# Patient Record
Sex: MALE | Race: WHITE | URBAN - METROPOLITAN AREA
[De-identification: names, ages, dates, MRNs, and addresses within clinical notes are randomized per-mention and may not be internally consistent; named-entity substitution may affect disease eponyms.]

---

## 2020-11-10 ENCOUNTER — OFFICE VISIT (OUTPATIENT)
Dept: ALLERGY | Facility: CLINIC | Age: 35
End: 2020-11-10
Payer: COMMERCIAL

## 2020-11-10 ENCOUNTER — NURSE ONLY (OUTPATIENT)
Dept: ALLERGY | Facility: CLINIC | Age: 35
End: 2020-11-10
Payer: COMMERCIAL

## 2020-11-10 VITALS
BODY MASS INDEX: 28.88 KG/M2 | SYSTOLIC BLOOD PRESSURE: 127 MMHG | HEIGHT: 74 IN | DIASTOLIC BLOOD PRESSURE: 90 MMHG | OXYGEN SATURATION: 97 % | HEART RATE: 88 BPM | WEIGHT: 225 LBS

## 2020-11-10 DIAGNOSIS — J30.89 PERENNIAL ALLERGIC RHINITIS: ICD-10-CM

## 2020-11-10 DIAGNOSIS — J32.9 CHRONIC SINUSITIS, UNSPECIFIED LOCATION: Primary | ICD-10-CM

## 2020-11-10 DIAGNOSIS — J32.9 CHRONIC SINUSITIS, UNSPECIFIED LOCATION: ICD-10-CM

## 2020-11-10 PROCEDURE — 3008F BODY MASS INDEX DOCD: CPT | Performed by: ALLERGY & IMMUNOLOGY

## 2020-11-10 PROCEDURE — 3074F SYST BP LT 130 MM HG: CPT | Performed by: ALLERGY & IMMUNOLOGY

## 2020-11-10 PROCEDURE — 3080F DIAST BP >= 90 MM HG: CPT | Performed by: ALLERGY & IMMUNOLOGY

## 2020-11-10 PROCEDURE — 95004 PERQ TESTS W/ALRGNC XTRCS: CPT | Performed by: ALLERGY & IMMUNOLOGY

## 2020-11-10 PROCEDURE — 95024 IQ TESTS W/ALLERGENIC XTRCS: CPT | Performed by: ALLERGY & IMMUNOLOGY

## 2020-11-10 PROCEDURE — 99204 OFFICE O/P NEW MOD 45 MIN: CPT | Performed by: ALLERGY & IMMUNOLOGY

## 2020-11-10 RX ORDER — CEFPODOXIME PROXETIL 200 MG/1
200 TABLET, FILM COATED ORAL
COMMUNITY
Start: 2020-10-30

## 2020-11-10 RX ORDER — BETAMETHASONE DIPROPIONATE 0.5 MG/G
LOTION TOPICAL
COMMUNITY

## 2020-11-10 RX ORDER — AZITHROMYCIN 250 MG/1
TABLET, FILM COATED ORAL
COMMUNITY
Start: 2019-10-28

## 2020-11-10 RX ORDER — CETIRIZINE HYDROCHLORIDE 10 MG/1
10 TABLET ORAL
COMMUNITY

## 2020-11-10 RX ORDER — OMEPRAZOLE 20 MG/1
1 TABLET, DELAYED RELEASE ORAL DAILY
COMMUNITY

## 2020-11-10 RX ORDER — AZELASTINE 1 MG/ML
2 SPRAY, METERED NASAL
COMMUNITY
Start: 2020-10-30

## 2020-11-10 RX ORDER — AMOXICILLIN 500 MG/1
CAPSULE ORAL
COMMUNITY
Start: 2020-08-26

## 2020-11-10 NOTE — PATIENT INSTRUCTIONS
Recs:  Handouts on allergic rhinitis versus nonallergic rhinitis provided and reviewed  Handouts on allergies and avoidance measures provided and reviewed including the potential treatment option of immunotherapy if individual allergens are detected  Robyn Heller

## 2020-11-10 NOTE — PROGRESS NOTES
Amrik Island is a 37year old male.     HPI:   Patient presents with:  Sinus Problem: has had allergies for the past 10 years with sinus issues,at times has sinus pressure and congestion, most of the time congestion is clear, no fevers, at times has it 0.05 % External Lotion betamethasone dipropionate 0.05 % lotion     • Cefpodoxime Proxetil 200 MG Oral Tab Take 200 mg by mouth.          Allergies:    Amoxicillin-Pot Cla*    DIARRHEA, NAUSEA ONLY  Dust Mite Extract       Runny nose      ROS:     Allergic/ sinusitis, unspecified location  (primary encounter diagnosis)  Perennial allergic rhinitis    Patient is a 61-year-old male with a 10+ year history of reported environmental allergies and sinus symptoms. Symptoms have worsened over the past year.   Yoly myself. Teaching included  a review of potential adverse side effects as well as potential efficacy. Patient's questions were answered in regards to medication administration and dosing and potential side effects.  Teaching was provided via the teach back

## 2021-11-08 ENCOUNTER — APPOINTMENT (OUTPATIENT)
Dept: URBAN - METROPOLITAN AREA CLINIC 321 | Age: 36
Setting detail: DERMATOLOGY
End: 2021-11-08

## 2021-11-08 DIAGNOSIS — Q828 OTHER SPECIFIED ANOMALIES OF SKIN: ICD-10-CM

## 2021-11-08 DIAGNOSIS — Q826 OTHER SPECIFIED ANOMALIES OF SKIN: ICD-10-CM

## 2021-11-08 DIAGNOSIS — B36.0 PITYRIASIS VERSICOLOR: ICD-10-CM

## 2021-11-08 DIAGNOSIS — Q819 OTHER SPECIFIED ANOMALIES OF SKIN: ICD-10-CM

## 2021-11-08 DIAGNOSIS — L738 OTHER SPECIFIED DISEASES OF HAIR AND HAIR FOLLICLES: ICD-10-CM

## 2021-11-08 DIAGNOSIS — Z87.2 PERSONAL HISTORY OF DISEASES OF THE SKIN AND SUBCUTANEOUS TISSUE: ICD-10-CM

## 2021-11-08 DIAGNOSIS — L663 OTHER SPECIFIED DISEASES OF HAIR AND HAIR FOLLICLES: ICD-10-CM

## 2021-11-08 PROBLEM — L02.425 FURUNCLE OF RIGHT LOWER LIMB: Status: ACTIVE | Noted: 2021-11-08

## 2021-11-08 PROBLEM — L85.8 OTHER SPECIFIED EPIDERMAL THICKENING: Status: ACTIVE | Noted: 2021-11-08

## 2021-11-08 PROBLEM — L02.426 FURUNCLE OF LEFT LOWER LIMB: Status: ACTIVE | Noted: 2021-11-08

## 2021-11-08 PROBLEM — L02.12 FURUNCLE OF NECK: Status: ACTIVE | Noted: 2021-11-08

## 2021-11-08 PROCEDURE — OTHER PRESCRIPTION: OTHER

## 2021-11-08 PROCEDURE — OTHER COUNSELING: OTHER

## 2021-11-08 PROCEDURE — 99214 OFFICE O/P EST MOD 30 MIN: CPT

## 2021-11-08 RX ORDER — CLINDAMYCIN PHOSPHATE 10 MG/G
GEL TOPICAL
Qty: 30 | Refills: 5 | Status: ERX | COMMUNITY
Start: 2021-11-08

## 2021-11-08 RX ORDER — KETOCONAZOLE 20 MG/ML
SHAMPOO, SUSPENSION TOPICAL BID
Qty: 120 | Refills: 10 | Status: ERX | COMMUNITY
Start: 2021-11-08

## 2021-11-08 RX ORDER — FLUCONAZOLE 150 MG/1
TABLET ORAL
Qty: 4 | Refills: 0 | Status: ERX | COMMUNITY
Start: 2021-11-08

## 2021-11-08 ASSESSMENT — LOCATION DETAILED DESCRIPTION DERM
LOCATION DETAILED: RIGHT ANTERIOR PROXIMAL THIGH
LOCATION DETAILED: RIGHT PROXIMAL POSTERIOR UPPER ARM
LOCATION DETAILED: RIGHT INFERIOR POSTERIOR NECK
LOCATION DETAILED: SUPERIOR LUMBAR SPINE
LOCATION DETAILED: LEFT CENTRAL MALAR CHEEK
LOCATION DETAILED: RIGHT INFERIOR MEDIAL MIDBACK
LOCATION DETAILED: RIGHT LATERAL MALAR CHEEK
LOCATION DETAILED: LEFT PROXIMAL POSTERIOR UPPER ARM
LOCATION DETAILED: LEFT ANTERIOR PROXIMAL THIGH

## 2021-11-08 ASSESSMENT — LOCATION ZONE DERM
LOCATION ZONE: ARM
LOCATION ZONE: LEG
LOCATION ZONE: NECK
LOCATION ZONE: TRUNK
LOCATION ZONE: FACE

## 2021-11-08 ASSESSMENT — LOCATION SIMPLE DESCRIPTION DERM
LOCATION SIMPLE: LEFT CHEEK
LOCATION SIMPLE: RIGHT CHEEK
LOCATION SIMPLE: RIGHT UPPER ARM
LOCATION SIMPLE: LOWER BACK
LOCATION SIMPLE: LEFT THIGH
LOCATION SIMPLE: POSTERIOR NECK
LOCATION SIMPLE: RIGHT LOWER BACK
LOCATION SIMPLE: LEFT UPPER ARM
LOCATION SIMPLE: RIGHT THIGH

## 2022-11-09 ENCOUNTER — APPOINTMENT (OUTPATIENT)
Dept: URBAN - METROPOLITAN AREA CLINIC 244 | Age: 37
Setting detail: DERMATOLOGY
End: 2022-11-13

## 2022-11-09 DIAGNOSIS — B36.0 PITYRIASIS VERSICOLOR: ICD-10-CM

## 2022-11-09 DIAGNOSIS — L82.1 OTHER SEBORRHEIC KERATOSIS: ICD-10-CM

## 2022-11-09 DIAGNOSIS — Z87.2 PERSONAL HISTORY OF DISEASES OF THE SKIN AND SUBCUTANEOUS TISSUE: ICD-10-CM

## 2022-11-09 DIAGNOSIS — L81.4 OTHER MELANIN HYPERPIGMENTATION: ICD-10-CM

## 2022-11-09 DIAGNOSIS — D22 MELANOCYTIC NEVI: ICD-10-CM

## 2022-11-09 PROBLEM — D22.5 MELANOCYTIC NEVI OF TRUNK: Status: ACTIVE | Noted: 2022-11-09

## 2022-11-09 PROCEDURE — OTHER COUNSELING: OTHER

## 2022-11-09 PROCEDURE — OTHER PRESCRIPTION: OTHER

## 2022-11-09 PROCEDURE — 99214 OFFICE O/P EST MOD 30 MIN: CPT

## 2022-11-09 RX ORDER — KETOCONAZOLE 20 MG/ML
SHAMPOO, SUSPENSION TOPICAL BID
Qty: 120 | Refills: 10 | Status: ERX

## 2022-11-09 RX ORDER — KETOCONAZOLE 20 MG/G
CREAM TOPICAL BID
Qty: 60 | Refills: 10 | Status: ERX | COMMUNITY
Start: 2022-11-09

## 2022-11-09 ASSESSMENT — LOCATION DETAILED DESCRIPTION DERM
LOCATION DETAILED: RIGHT SUPERIOR MEDIAL UPPER BACK
LOCATION DETAILED: LEFT MEDIAL UPPER BACK
LOCATION DETAILED: RIGHT INFERIOR MEDIAL MIDBACK
LOCATION DETAILED: SUPERIOR LUMBAR SPINE
LOCATION DETAILED: UPPER STERNUM

## 2022-11-09 ASSESSMENT — LOCATION SIMPLE DESCRIPTION DERM
LOCATION SIMPLE: CHEST
LOCATION SIMPLE: LEFT UPPER BACK
LOCATION SIMPLE: RIGHT LOWER BACK
LOCATION SIMPLE: RIGHT UPPER BACK
LOCATION SIMPLE: LOWER BACK

## 2022-11-09 ASSESSMENT — LOCATION ZONE DERM: LOCATION ZONE: TRUNK

## 2023-12-11 NOTE — H&P
[facility] - Gastroenterology                                                                                                               Reason for consult: eval    Requesting physician or provider: Jazmin Cloud MD    Chief Complaint   Patient presents with    Gastro-esophageal Reflux       HPI:   Mello Dahl is a 41year old year-old male without significant Pmhx:     he is here today for evaluation gerd  Onset of sx 2015   Was having dental issues and was started on ibuprofen. 1 mos after use had an alcholic drink and that night and woke with heartburn. Since the has had gerd, heartburn. He has been on omeprazole 20 mg/daily with improvement in complaints. Sx return when he stops therapy. Has had dysphagia 1-2x with solids since onset of symptoms at sternal notch. No odynophagia and/or globus. he says had abdominal pain last summer an attributed to constipation. he denies nausea and/or vomiting. he denies recent change in appetite and/or unintentional weight loss. he moves his bowels daily with use of probiotic. he denies straining and/or incomplete evacuation. H/o brbpr with hemorrhoids. No melena. Not anemic on cbc 2/2023    Saw ent at [facility] and had laryngoscope in aug  He does not recall results    NSAIDS: prn  Tobacco: no  Alcohol: occasional  Marijuana: no  Illicit drugs: no    No FDR w/ GI malignancy  Paternal gf had colon ca around age 59    No history of adverse reaction to sedation  No FIDEL  No anticoagulants  No pacemaker/defibrillator  Nortriptyline for migraines      Last colonoscopy: no  Last EGD: no    Wt Readings from Last 6 Encounters:   12/13/23 251 lb (113.9 kg)   11/10/20 225 lb (102.1 kg)        History, Medications, Allergies, ROS:      History reviewed. No pertinent past medical history.    Past Surgical History:   Procedure Laterality Date    COLONOSCOPY  11/2015    @ [facility] Family Hx:   Family History   Problem Relation Age of Onset    Cancer Mother 29        cervical cancer and melonoma    Hypertension Maternal Grandfather     Lipids Maternal Grandfather     Cancer Paternal Grandfather 61        colon cancer      Social History:   Social History     Socioeconomic History    Marital status: Single   Tobacco Use    Smoking status: Never    Smokeless tobacco: Never   Substance and Sexual Activity    Alcohol use: Yes     Comment: rarely    Drug use: Never        Medications (Active prior to today's visit):  Current Outpatient Medications   Medication Sig Dispense Refill    escitalopram 20 MG Oral Tab Take 1 tablet (20 mg total) by mouth daily. famotidine 20 MG Oral Tab Take 1 tablet (20 mg total) by mouth daily. fluticasone propionate 50 MCG/ACT Nasal Suspension 1 spray by Nasal route daily. ketoconazole 2 % External Shampoo       nortriptyline 25 MG Oral Cap TAKE 1 CAPSULE BY MOUTH AT BEDTIME. TAKE IN COMBINATION WITH 10MG FOR A NIGHTLY TOTAL OF 35MG. Omeprazole 40 MG Oral Capsule Delayed Release Take 1 capsule (40 mg total) by mouth daily. Take 1 capsule by mouth daily before breakfast. 30 capsule 3    PEG 3350-KCl-Na Bicarb-NaCl (TRILYTE) 420 g Oral Recon Soln Take prep as directed by gastro office. May substitute with Trilyte/generic equivalent if needed. 4000 mL 0    cetirizine 10 MG Oral Tab Take 1 tablet (10 mg total) by mouth. Omeprazole Magnesium 20 MG Oral Tab EC Take 1 tablet (20 mg total) by mouth daily. Azelastine HCl 0.1 % Nasal Solution 2 sprays. Betamethasone Dipropionate 0.05 % External Lotion betamethasone dipropionate 0.05 % lotion      Cefpodoxime Proxetil 200 MG Oral Tab Take 1 tablet (200 mg total) by mouth. amoxicillin 500 MG Oral Cap TAKE 1 CAPSULE BY MOUTH 3 TIMES A DAY UNTIL GONE      azithromycin 250 MG Oral Tab azithromycin 250 mg tablet (Patient not taking: Reported on 12/13/2023)         Allergies:   Allergies Allergen Reactions    Amoxicillin-Pot Clavulanate DIARRHEA and NAUSEA ONLY    Dust Mite Extract Runny nose       ROS:   CONSTITUTIONAL: negative for fevers, chills, sweats and weight loss  EYES Negative for red eyes, yellow eyes, changes in vision  HEENT: Positive for dysphagia and negative for hoarseness  RESPIRATORY: Negative for cough and shortness of breath  CARDIOVASCULAR: Negative for chest pain, palpitations  GASTROINTESTINAL: See HPI  GENITOURINARY: Negative for dysuria and frequency  MUSCULOSKELETAL: Negative for arthralgias and myalgias  NEUROLOGICAL: Negative for dizziness and headaches  BEHAVIOR/PSYCH: Negative for anxiety and poor appetite    PHYSICAL EXAM:   Blood pressure 130/89, pulse 92, height 6' 1\" (1.854 m), weight 251 lb (113.9 kg). GEN: WD/WN, NAD  HEENT: Supple symmetrical, trachea midline  CV: RRR, the extremities are warm and well perfused   LUNGS: No increased work of breathing  ABDOMEN: No scars, normal bowel sounds, soft, non-tender, non-distended no rebound or guarding, no masses, no hepatomegaly  MSK: No redness, no warmth, no swelling of joints  SKIN: No jaundice, no erythema, no rashes  HEMATOLOGIC: No bleeding, no bruising  NEURO: Alert and interactive, normal gait    Labs/Imaging/Procedures:     Patient's pertinent labs and imaging were reviewed and discussed with patient today. .  ASSESSMENT/PLAN:   Rojelio Lawson is a 41year old year-old male without significant Pmhx:     #constipation  #brbpr  Improved with probiotic. Suspect brbpr related. Not anemic on cbc 2/2023. Paternal gf with colon ca. Has not had colon and think reasonable to exclude polyp, etc.     #gerd  #dysphagia  Chronic symptoms and suspect dysphagia episodes related. No melena, unintentional weight loss. No h/o tobacco use. Has not had egd and will plan for procedure to exclude reflux change.      -probiotic  -reflux diet modification  -avoid nsaids  -omeprazole  -avoid hard solids  -care with chewing, swallowing    1. Schedule colonoscopy/egd with [facility] w/ Dr. Veronica Mcknight or Dr. Alejandro Camejo w/ possible [Diagnosis: brbpr, gerd]    2.  bowel prep from pharmacy (split trilyte)    3. Continue all medications prior to procedure    4. Read all bowel prep instructions carefully    5. AVOID seeds, nuts, popcorn, raw fruits and vegetables (cooked is okay) for 2-3 days before procedure    6. You MAY need to go for COVID testing 72 hours before procedure. The testing team will call you a few days before your procedure to discuss with you if testing is required. If you are asked to go for COVID testing and do not completed the test, the procedure cannot be performed. 7. If you start any NEW medication after your visit today, please notify us. Certain medications will need to be held before the procedure, or the procedure cannot be performed.     >>>Please note: if you were prescribed Suprep for the bowel prep and it is too expensive or not covered by insurance, it is okay to substitute Trilyte (or any similar generic prep). This can be done by notifying the pharmacy or calling our office. Orders This Visit:  No orders of the defined types were placed in this encounter. Meds This Visit:  Requested Prescriptions     Signed Prescriptions Disp Refills    Omeprazole 40 MG Oral Capsule Delayed Release 30 capsule 3     Sig: Take 1 capsule (40 mg total) by mouth daily. Take 1 capsule by mouth daily before breakfast.    PEG 3350-KCl-Na Bicarb-NaCl (TRILYTE) 420 g Oral Recon Soln 4000 mL 0     Sig: Take prep as directed by gastro office. May substitute with Trilyte/generic equivalent if needed. Imaging & Referrals:  None    ENDOSCOPIC RISK BENEFIT DISCUSSION: I described the procedure in great detail with the patient. I discussed the risks and benefits, including but not limited to: bleeding, perforation, infection, anesthesia complications, and even death.  Patient will be NPO after midnight and will have a person physically present at time of pick-up to drive patient home. Patient verbalized understanding and agrees to proceed with procedure as planned. Wil Tidwell. Nina Cox, APRN   12/13/2023        This note was partially prepared using [employer] voice recognition dictation software. As a result, errors may occur. When identified, these errors have been corrected.  While every attempt is made to correct errors during dictation, discrepancies may still exist.

## 2023-12-13 ENCOUNTER — OFFICE VISIT (OUTPATIENT)
Dept: GASTROENTEROLOGY | Facility: CLINIC | Age: 38
End: 2023-12-13
Payer: COMMERCIAL

## 2023-12-13 ENCOUNTER — TELEPHONE (OUTPATIENT)
Dept: GASTROENTEROLOGY | Facility: CLINIC | Age: 38
End: 2023-12-13

## 2023-12-13 VITALS
HEIGHT: 73 IN | SYSTOLIC BLOOD PRESSURE: 130 MMHG | BODY MASS INDEX: 33.27 KG/M2 | HEART RATE: 92 BPM | WEIGHT: 251 LBS | DIASTOLIC BLOOD PRESSURE: 89 MMHG

## 2023-12-13 DIAGNOSIS — K59.00 CONSTIPATION, UNSPECIFIED CONSTIPATION TYPE: ICD-10-CM

## 2023-12-13 DIAGNOSIS — K62.5 BRBPR (BRIGHT RED BLOOD PER RECTUM): ICD-10-CM

## 2023-12-13 DIAGNOSIS — K21.9 GASTROESOPHAGEAL REFLUX DISEASE WITHOUT ESOPHAGITIS: ICD-10-CM

## 2023-12-13 DIAGNOSIS — K62.5 BRIGHT RED BLOOD PER RECTUM: Primary | ICD-10-CM

## 2023-12-13 DIAGNOSIS — K21.9 GASTROESOPHAGEAL REFLUX DISEASE, UNSPECIFIED WHETHER ESOPHAGITIS PRESENT: Primary | ICD-10-CM

## 2023-12-13 DIAGNOSIS — R13.19 ESOPHAGEAL DYSPHAGIA: ICD-10-CM

## 2023-12-13 PROCEDURE — 3079F DIAST BP 80-89 MM HG: CPT | Performed by: NURSE PRACTITIONER

## 2023-12-13 PROCEDURE — 99204 OFFICE O/P NEW MOD 45 MIN: CPT | Performed by: NURSE PRACTITIONER

## 2023-12-13 PROCEDURE — 3075F SYST BP GE 130 - 139MM HG: CPT | Performed by: NURSE PRACTITIONER

## 2023-12-13 PROCEDURE — 3008F BODY MASS INDEX DOCD: CPT | Performed by: NURSE PRACTITIONER

## 2023-12-13 RX ORDER — FLUTICASONE PROPIONATE 50 MCG
1 SPRAY, SUSPENSION (ML) NASAL DAILY
COMMUNITY

## 2023-12-13 RX ORDER — POLYETHYLENE GLYCOL 3350, SODIUM CHLORIDE, SODIUM BICARBONATE, POTASSIUM CHLORIDE 420; 11.2; 5.72; 1.48 G/4L; G/4L; G/4L; G/4L
POWDER, FOR SOLUTION ORAL
Qty: 4000 ML | Refills: 0 | Status: SHIPPED | OUTPATIENT
Start: 2023-12-13

## 2023-12-13 RX ORDER — OMEPRAZOLE 40 MG/1
40 CAPSULE, DELAYED RELEASE ORAL DAILY
Qty: 30 CAPSULE | Refills: 3 | Status: SHIPPED | OUTPATIENT
Start: 2023-12-13

## 2023-12-13 RX ORDER — NORTRIPTYLINE HYDROCHLORIDE 25 MG/1
CAPSULE ORAL
COMMUNITY

## 2023-12-13 RX ORDER — FAMOTIDINE 20 MG/1
20 TABLET, FILM COATED ORAL DAILY
COMMUNITY
Start: 2023-08-03

## 2023-12-13 RX ORDER — KETOCONAZOLE 20 MG/ML
SHAMPOO TOPICAL
COMMUNITY

## 2023-12-13 RX ORDER — ESCITALOPRAM OXALATE 20 MG/1
20 TABLET ORAL DAILY
COMMUNITY
Start: 2023-11-28

## 2023-12-13 NOTE — TELEPHONE ENCOUNTER
Scheduled for: Colonoscopy 14471/16526/EGD [address]   Provider Name: Dr Bladimir Magallanes  Date: Mon 3/25/2024   Location: [facility]  Sedation: [facility]   Time: 10:45 am, (pt is aware that [facility] will call the day before to confirm arrival time)  Prep: split trilyte    Meds/Allergies Reconciled?: reviewed by provider  Diagnosis with codes: bright red blood per rectum K62.5, Derral Failing K21.9   Was patient informed to call insurance with codes (Y/N): Yes   Referral sent?: Yes  [facility] or [address] [place] [address] notified?: Electronic case request was sent to [facility] via epic/eos. Medication Orders: Pt is aware to NOT take iron pills, herbal meds and diet supplements for 7 days before exam. Also to NOT take any form of alcohol, recreational drugs and any forms of ED meds 24 hours before exam.      Misc Orders:       Further instructions given by staff:  Instructions given in office and pt verbalized understanding

## 2024-03-18 RX ORDER — OMEPRAZOLE 40 MG/1
40 CAPSULE, DELAYED RELEASE ORAL DAILY
Qty: 90 CAPSULE | Refills: 1 | Status: SHIPPED | OUTPATIENT
Start: 2024-03-18

## 2024-03-18 NOTE — TELEPHONE ENCOUNTER
Requested Prescriptions     Pending Prescriptions Disp Refills    OMEPRAZOLE 40 MG Oral Capsule Delayed Release [Pharmacy Med Name: OMEPRAZOLE DR 40 MG CAPSULE] 90 capsule 1     Sig: TAKE 1 CAPSULE (40 MG TOTAL) BY MOUTH DAILY. TAKE 1 CAPSULE BY MOUTH DAILY BEFORE BREAKFAST.        LOV   12/13/2023    LR   12/13/2023      sb

## 2024-03-19 ENCOUNTER — TELEPHONE (OUTPATIENT)
Facility: CLINIC | Age: 39
End: 2024-03-19

## 2024-03-19 DIAGNOSIS — K62.5 BRBPR (BRIGHT RED BLOOD PER RECTUM): Primary | ICD-10-CM

## 2024-03-19 NOTE — TELEPHONE ENCOUNTER
[facility] scheduled [facility].     Left message for patient to see if he wanted to proceed on 3/25 or cancel.

## 2024-03-20 PROBLEM — R06.83 SNORING: Status: ACTIVE | Noted: 2024-03-20

## 2024-03-20 PROBLEM — G47.09 TROUBLE GETTING TO SLEEP: Status: ACTIVE | Noted: 2024-03-20

## 2024-03-20 PROBLEM — G44.52 NEW DAILY PERSISTENT HEADACHE: Status: ACTIVE | Noted: 2022-01-03

## 2024-03-20 PROBLEM — G47.33 OBSTRUCTIVE SLEEP APNEA (ADULT) (PEDIATRIC): Status: ACTIVE | Noted: 2024-03-20

## 2024-03-20 NOTE — TELEPHONE ENCOUNTER
Scheduling:  Please cancel c-scope for 3/25/24 with dr. Og    Pt would like to proceed with EGD ONLY    Nursing:  Can we send get consent from the pt to request his c-scope 2015 from [facility]?    Thank you,    Izabel

## 2024-03-20 NOTE — TELEPHONE ENCOUNTER
Izabel,    Patient is scheduled for colonoscopy/egd on 3/25/24. States he spoke to his pcp and since he had a colonoscopy last in 2015 and nothing was found they don't think cln needed at this time.     States he is no longer having bleeding at this time. Wanted to get your thoughts on if it is necessary at this time.

## 2024-03-20 NOTE — TELEPHONE ENCOUNTER
Izabel,   Please see colon report from [place]. No specimens collected. Internal hemorrhoids noted. Sent to scan.  Thanks,  Jacque                   parent/prenatal chart

## 2024-03-20 NOTE — TELEPHONE ENCOUNTER
I returned the pts call.  He reports last had brbpr around the holidays.  He denies unintentional weight loss change in bowel habits.    No FDR w/ GI malignancy  Paternal gf had colon ca around age 64    He reports undergoing c-scope 2015 at [facility] - procedure report/path not in care everywhere    Last cbc 2/2023 not anemic.     We discussed considering c-scope to eval polyp, source of bleeding, etc.  Vs waiting/watching.  He is electing to cancel cln and proceed with egd only at this time.    Plan:    Repeat cbc  Request c-scope 2015 from [facility]  Plan for egd only   Rtc for on-going sx and consider c-scope    He verbalizes understanding and is in agreement with the plan

## 2024-03-21 NOTE — TELEPHONE ENCOUNTER
APN reviewed procedure report.     Recommend:  Labs  Advise c-scope if on-going brbpr, anemia, change in bm, unintentional weight loss, etc    Detailed message left for pt with above.    Thanks,  C

## 2024-03-25 PROBLEM — K22.9 IRREGULAR Z LINE OF ESOPHAGUS: Status: ACTIVE | Noted: 2024-03-25

## 2024-03-25 PROBLEM — K44.9 HIATAL HERNIA: Status: ACTIVE | Noted: 2024-03-25

## 2024-04-09 ENCOUNTER — OFFICE VISIT (OUTPATIENT)
Dept: SURGERY | Facility: CLINIC | Age: 39
End: 2024-04-09
Payer: COMMERCIAL

## 2024-04-09 VITALS
DIASTOLIC BLOOD PRESSURE: 80 MMHG | HEART RATE: 118 BPM | OXYGEN SATURATION: 97 % | WEIGHT: 250 LBS | SYSTOLIC BLOOD PRESSURE: 122 MMHG | HEIGHT: 72.2 IN | BODY MASS INDEX: 33.86 KG/M2

## 2024-04-09 DIAGNOSIS — E66.9 OBESITY (BMI 30-39.9): ICD-10-CM

## 2024-04-09 DIAGNOSIS — K21.9 GASTRIC REFLUX: ICD-10-CM

## 2024-04-09 DIAGNOSIS — E78.5 DYSLIPIDEMIA: ICD-10-CM

## 2024-04-09 DIAGNOSIS — G47.33 OBSTRUCTIVE SLEEP APNEA (ADULT) (PEDIATRIC): Primary | ICD-10-CM

## 2024-04-09 NOTE — PROGRESS NOTES
[facility] Consultation Note       Patient:  Chas Lennon  :      1985  MRN:      [id number]    Referring Provider: Dr. Og       Chief Complaint:    Chief Complaint   Patient presents with    Consult    Weight Management       SUBJECTIVE     History of Present Illness:  Chas Lennon has been referred to me for evaluation and treatment.       37 yo who lives alone  Does his own cooking  Desk job  Currently at heaviest weight    Patient has tried several diets in the past including exercises and is frustrated with increase of weight. Weight has been a struggle for the past several years and is now starting to develop into co-morbidities that are worrisome to the patient. Patient is interested in losing weight, so it can stay off long term.    Patient also understands that this is a life style change and wants to get on track.    Interested in non surgical weight loss    Past Medical History:   Past Medical History:   Diagnosis Date    Esophageal reflux     Obesity (BMI 30-39.9)     Sleep apnea     Visual impairment        OBJECTIVE     Vitals: /80 (BP Location: Left arm, Patient Position: Sitting, Cuff Size: adult)   Pulse 118   Ht 6' 0.2\" (1.834 m)   Wt 250 lb (113.4 kg)   SpO2 97%   BMI 33.72 kg/m²      Patient Medications:    Current Outpatient Medications   Medication Sig Dispense Refill    Loratadine 5 MG Oral Chew Tab Chew 5 mg by mouth daily.      Omeprazole 40 MG Oral Capsule Delayed Release Take 1 capsule (40 mg total) by mouth daily. Take 1 capsule by mouth daily before breakfast. 90 capsule 1    escitalopram 20 MG Oral Tab Take 1 tablet (20 mg total) by mouth daily. (Patient not taking: Reported on 3/25/2024)      famotidine 20 MG Oral Tab Take 1 tablet (20 mg total) by mouth daily.      fluticasone propionate 50 MCG/ACT Nasal Suspension 1 spray by Nasal route daily.      ketoconazole 2 % External Shampoo  (Patient not taking: Reported on 3/25/2024)       nortriptyline 25 MG Oral Cap TAKE 1 CAPSULE BY MOUTH AT BEDTIME. TAKE IN COMBINATION WITH 10MG FOR A NIGHTLY TOTAL OF 35MG.      PEG 3350-KCl-Na Bicarb-NaCl (TRILYTE) 420 g Oral Recon Soln Take prep as directed by gastro office. May substitute with Trilyte/generic equivalent if needed. 4000 mL 0    cetirizine 10 MG Oral Tab Take 1 tablet (10 mg total) by mouth. (Patient not taking: Reported on 3/25/2024)      Omeprazole Magnesium 20 MG Oral Tab EC Take 1 tablet (20 mg total) by mouth daily.      amoxicillin 500 MG Oral Cap TAKE 1 CAPSULE BY MOUTH 3 TIMES A DAY UNTIL GONE      Azelastine HCl 0.1 % Nasal Solution 2 sprays. (Patient not taking: Reported on 3/25/2024)      azithromycin 250 MG Oral Tab azithromycin 250 mg tablet (Patient not taking: Reported on 3/25/2024)      Betamethasone Dipropionate 0.05 % External Lotion betamethasone dipropionate 0.05 % lotion (Patient not taking: Reported on 3/25/2024)      Cefpodoxime Proxetil 200 MG Oral Tab Take 1 tablet (200 mg total) by mouth. (Patient not taking: Reported on 3/25/2024)         Allergies:  Amoxicillin-pot clavulanate and Dust mite extract     Comorbidities:  GERD    Social History:    Social History     Socioeconomic History    Marital status: Single     Spouse name: Not on file    Number of children: Not on file    Years of education: Not on file    Highest education level: Not on file   Occupational History    Not on file   Tobacco Use    Smoking status: Never    Smokeless tobacco: Never   Vaping Use    Vaping Use: Never used   Substance and Sexual Activity    Alcohol use: Yes     Comment: rarely    Drug use: Never    Sexual activity: Not on file   Other Topics Concern    Not on file   Social History Narrative    Not on file     Social Determinants of Health     Financial Resource Strain: Not on file   Food Insecurity: Not on file   Transportation Needs: Not on file   Physical Activity: Not on file   Stress: Not on file   Social Connections: Not on file    Housing Stability: Not on file     Surgical History:    Past Surgical History:   Procedure Laterality Date    COLONOSCOPY  11/2015    @ [facility]       Family History:    Family History   Problem Relation Age of Onset    Cancer Mother 28        cervical cancer and melonoma    Hypertension Maternal Grandfather     Lipids Maternal Grandfather     Cancer Paternal Grandfather 63        colon cancer           Typical Dietary Intake:  Breakfast AM Snack Lunch PM Snack Dinner   Skips, banana Chips, cookies Pb j sandwich, chips, FF, pizza cookies Pasta, chicken, microwave meals     Soda Drinker?: Yes  If yes, how much?:  regular    Number of restaurant or fast food meals/week:  2 meals/week    Nutritional Goals Reviewed and Discussed:     Limit carbohydrates to 100 gms per day, Eat 100-200 calories within 1 hour of waking up, and Eat 3-4 cups of fresh fruit or vegetables daily    Behavior Modifications Reviewed and Discussed:    Eat breakfast, Eat 3 meals per day, Plan meals in advance, Read nutrition labels, Drink 64oz of water per day, Maintain a daily food journal, No drinking 30 minutes before or after meals, Utilize portion control strategies to reduce calorie intake, Identify triggers for eating and manage cues, and Eat slowly and take 20 to 30 minutes to complete each meal    Exercise: not at this time    ROS:  Constitutional: positive for fatigue  Respiratory: negative  Cardiovascular: negative  Gastrointestinal: positive for reflux symptoms  Musculoskeletal:negative  Neurological: positive for headaches  Behavioral/Psych: positive for stress  Endocrine: negative  All other systems were reviewed and are negative.    Physical Exam:  General appearance: alert, appears stated age, cooperative, and morbidly obese  Head: Normocephalic, without obvious abnormality, atraumatic  Back: symmetric, no curvature. ROM normal. No CVA tenderness.  Lungs: clear to auscultation bilaterally  Heart: S1, S2 normal, no murmur, click, rub  or gallop, regular rate and rhythm  Abdomen: soft, non-tender; bowel sounds normal; no masses,  no organomegaly  Extremities: extremities normal, atraumatic, no cyanosis or edema  Pulses: 2+ and symmetric  Skin: Skin color, texture, turgor normal. No rashes or lesions  Lymph nodes: Cervical, supraclavicular, and axillary nodes normal.  Neurologic: Grossly normal    ASSESSMENT     OBSTRUCTIVE SLEEP APNEA: The patient states his sleep apnea has been stable since the last clinic visit. There has not been any increase in hyper-somnolence.       Encounter Diagnosis(ses):   1. Obstructive sleep apnea (adult) (pediatric)    2. Gastric reflux    3. Dyslipidemia    4. Obesity (BMI 30-39.9)        PLAN     Patient is not interested in bariatric surgery. Patient desires to pursue traditional weight loss at this time.      GERD: The patient believes his reflux is somewhat better of at least no worse on current medication. He was encouraged to avoid caffeine products, elevated head of bed and not eat for 1 hour before bedtime. No interval changes were made in his anti-reflux medications.     DYSLIPIDEMIA: Stable on the above prescribed meal plan. Liver function stable.    No results found for: \"CHOLEST\", \"LDL\", \"HDL\", \"TRIG\", \"VLDL\", \"TCHDLRATIO\"    Goals for next month:  1. Keep a food log.  2. Drink 48-64 ounces of non-caloric beverages per day. No fruit juices or regular soda.  3. Increase activity-upper body exercises, walk 10 minutes per day.  4. Increase fruit and vegetable servings to 5-6 per day.      Should start watching carb intake    FIDEL: should improve with diet and exercise     No meds at this time    Needs to ambulate    Diagnoses and all orders for this visit:    Obstructive sleep apnea (adult) (pediatric)    Gastric reflux    Dyslipidemia    Obesity (BMI 30-39.9)        Nash Del Real MD

## 2024-04-11 ENCOUNTER — TELEPHONE (OUTPATIENT)
Dept: GASTROENTEROLOGY | Facility: CLINIC | Age: 39
End: 2024-04-11

## 2024-04-11 NOTE — TELEPHONE ENCOUNTER
I mailed out EGD results letter to pt  Updated health maintenance  EGD done 3/25/2024-No EGD recall entered

## 2024-04-19 ENCOUNTER — TELEPHONE (OUTPATIENT)
Facility: CLINIC | Age: 39
End: 2024-04-19

## 2024-05-05 ENCOUNTER — HOSPITAL ENCOUNTER (EMERGENCY)
Facility: HOSPITAL | Age: 39
Discharge: HOME | End: 2024-05-06
Attending: EMERGENCY MEDICINE
Payer: COMMERCIAL

## 2024-05-05 ENCOUNTER — APPOINTMENT (OUTPATIENT)
Dept: CT IMAGING | Facility: HOSPITAL | Age: 39
End: 2024-05-05
Attending: EMERGENCY MEDICINE
Payer: COMMERCIAL

## 2024-05-05 DIAGNOSIS — R10.32 ABDOMINAL PAIN, LEFT LOWER QUADRANT: Primary | ICD-10-CM

## 2024-05-05 DIAGNOSIS — K63.89 EPIPLOIC APPENDAGITIS: ICD-10-CM

## 2024-05-05 LAB
ALBUMIN SERPL-MCNC: 5.1 G/DL (ref 3.2–4.8)
ALP LIVER SERPL-CCNC: 102 U/L
ALT SERPL-CCNC: 23 U/L
ANION GAP SERPL CALC-SCNC: 6 MMOL/L (ref 0–18)
AST SERPL-CCNC: 21 U/L (ref ?–34)
BASOPHILS # BLD AUTO: 0.07 X10(3) UL (ref 0–0.2)
BASOPHILS NFR BLD AUTO: 0.7 %
BILIRUB DIRECT SERPL-MCNC: 0.1 MG/DL (ref ?–0.3)
BILIRUB SERPL-MCNC: 0.3 MG/DL (ref 0.3–1.2)
BUN BLD-MCNC: 12 MG/DL (ref 9–23)
BUN/CREAT SERPL: 11.2 (ref 10–20)
CALCIUM BLD-MCNC: 9.5 MG/DL (ref 8.7–10.4)
CHLORIDE SERPL-SCNC: 107 MMOL/L (ref 98–112)
CO2 SERPL-SCNC: 27 MMOL/L (ref 21–32)
CREAT BLD-MCNC: 1.07 MG/DL
DEPRECATED RDW RBC AUTO: 39 FL (ref 35.1–46.3)
EGFRCR SERPLBLD CKD-EPI 2021: 92 ML/MIN/1.73M2 (ref 60–?)
EOSINOPHIL # BLD AUTO: 0.13 X10(3) UL (ref 0–0.7)
EOSINOPHIL NFR BLD AUTO: 1.2 %
ERYTHROCYTE [DISTWIDTH] IN BLOOD BY AUTOMATED COUNT: 12.4 % (ref 11–15)
GLUCOSE BLD-MCNC: 102 MG/DL (ref 70–99)
HCT VFR BLD AUTO: 47.7 %
HGB BLD-MCNC: 15.7 G/DL
IMM GRANULOCYTES # BLD AUTO: 0.02 X10(3) UL (ref 0–1)
IMM GRANULOCYTES NFR BLD: 0.2 %
LIPASE SERPL-CCNC: 43 U/L (ref 13–75)
LYMPHOCYTES # BLD AUTO: 2.79 X10(3) UL (ref 1–4)
LYMPHOCYTES NFR BLD AUTO: 26.3 %
MCH RBC QN AUTO: 28.4 PG (ref 26–34)
MCHC RBC AUTO-ENTMCNC: 32.9 G/DL (ref 31–37)
MCV RBC AUTO: 86.3 FL
MONOCYTES # BLD AUTO: 0.79 X10(3) UL (ref 0.1–1)
MONOCYTES NFR BLD AUTO: 7.4 %
NEUTROPHILS # BLD AUTO: 6.82 X10 (3) UL (ref 1.5–7.7)
NEUTROPHILS # BLD AUTO: 6.82 X10(3) UL (ref 1.5–7.7)
NEUTROPHILS NFR BLD AUTO: 64.2 %
OSMOLALITY SERPL CALC.SUM OF ELEC: 290 MOSM/KG (ref 275–295)
PLATELET # BLD AUTO: 397 10(3)UL (ref 150–450)
POTASSIUM SERPL-SCNC: 3.8 MMOL/L (ref 3.5–5.1)
PROT SERPL-MCNC: 8.2 G/DL (ref 5.7–8.2)
RBC # BLD AUTO: 5.53 X10(6)UL
SODIUM SERPL-SCNC: 140 MMOL/L (ref 136–145)
WBC # BLD AUTO: 10.6 X10(3) UL (ref 4–11)

## 2024-05-05 PROCEDURE — 83690 ASSAY OF LIPASE: CPT | Performed by: EMERGENCY MEDICINE

## 2024-05-05 PROCEDURE — 74177 CT ABD & PELVIS W/CONTRAST: CPT | Performed by: EMERGENCY MEDICINE

## 2024-05-05 PROCEDURE — 99284 EMERGENCY DEPT VISIT MOD MDM: CPT

## 2024-05-05 PROCEDURE — 80048 BASIC METABOLIC PNL TOTAL CA: CPT | Performed by: EMERGENCY MEDICINE

## 2024-05-05 PROCEDURE — 80076 HEPATIC FUNCTION PANEL: CPT | Performed by: EMERGENCY MEDICINE

## 2024-05-05 PROCEDURE — 96361 HYDRATE IV INFUSION ADD-ON: CPT

## 2024-05-05 PROCEDURE — 93005 ELECTROCARDIOGRAM TRACING: CPT

## 2024-05-05 PROCEDURE — 96374 THER/PROPH/DIAG INJ IV PUSH: CPT

## 2024-05-05 PROCEDURE — 85025 COMPLETE CBC W/AUTO DIFF WBC: CPT | Performed by: EMERGENCY MEDICINE

## 2024-05-05 PROCEDURE — 93010 ELECTROCARDIOGRAM REPORT: CPT

## 2024-05-05 RX ORDER — KETOROLAC TROMETHAMINE 30 MG/ML
30 INJECTION, SOLUTION INTRAMUSCULAR; INTRAVENOUS ONCE
Status: COMPLETED | OUTPATIENT
Start: 2024-05-05 | End: 2024-05-05

## 2024-05-06 VITALS
HEIGHT: 72 IN | WEIGHT: 240 LBS | TEMPERATURE: 99 F | RESPIRATION RATE: 26 BRPM | OXYGEN SATURATION: 99 % | DIASTOLIC BLOOD PRESSURE: 87 MMHG | HEART RATE: 109 BPM | SYSTOLIC BLOOD PRESSURE: 132 MMHG | BODY MASS INDEX: 32.51 KG/M2

## 2024-05-06 LAB
ATRIAL RATE: 128 BPM
P AXIS: 43 DEGREES
P-R INTERVAL: 122 MS
Q-T INTERVAL: 308 MS
QRS DURATION: 80 MS
QTC CALCULATION (BEZET): 449 MS
R AXIS: 17 DEGREES
T AXIS: 42 DEGREES
VENTRICULAR RATE: 128 BPM

## 2024-05-06 NOTE — ED PROVIDER NOTES
Patient Seen in: [facility] Emergency Department    History     Chief Complaint   Patient presents with    Abdomen/Flank Pain       HPI    History is provided by patient/independent historian: Patient  38 year old male with history of GERD here with complaints of ongoing abdominal pain for the past 3 days.  Patient went to see immediate care who referred him to the emergency department, but he decided to wait it out until today when the pain was not getting better.  He is a work trip coming up and wanted to make sure he could continue to go.  No fevers, nausea vomiting or diarrhea.  No urinary symptoms.  No flank pain.    History reviewed.   Past Medical History:    Esophageal reflux    Obesity (BMI 30-39.9)    Sleep apnea    Visual impairment         History reviewed.   Past Surgical History:   Procedure Laterality Date    Colonoscopy  11/2015    @ [facility] Medications reviewed :  (Not in a hospital admission)        History reviewed.   Social History     Socioeconomic History    Marital status: Single   Tobacco Use    Smoking status: Never    Smokeless tobacco: Never   Vaping Use    Vaping status: Never Used   Substance and Sexual Activity    Alcohol use: Yes     Comment: rarely    Drug use: Never         ROS  Review of Systems   Respiratory:  Negative for shortness of breath.    Cardiovascular:  Negative for chest pain.   Gastrointestinal:  Positive for abdominal pain.   All other systems reviewed and are negative.     All other pertinent organ systems are reviewed and are negative.      Physical Exam     ED Triage Vitals [05/05/24 2124]   /86   Pulse (!) 133   Resp 22   Temp 99 °F (37.2 °C)   Temp src Temporal   SpO2 98 %   O2 Device None (Room air)     Vital signs reviewed.      Physical Exam  Vitals and nursing note reviewed.   Cardiovascular:      Pulses: Normal pulses.   Pulmonary:      Effort: No respiratory distress.   Abdominal:      General: There is no distension.       Tenderness: There is abdominal tenderness in the left lower quadrant.   Neurological:      Mental Status: He is alert.         ED Course       Labs:     Labs Reviewed   BASIC METABOLIC PANEL (8) - Abnormal; Notable for the following components:       Result Value    Glucose 102 (*)     All other components within normal limits   HEPATIC FUNCTION PANEL (7) - Abnormal; Notable for the following components:    Albumin 5.1 (*)     All other components within normal limits   LIPASE - Normal   CBC WITH DIFFERENTIAL WITH PLATELET    Narrative:     The following orders were created for panel order CBC With Differential With Platelet.                  Procedure                               Abnormality         Status                                     ---------                               -----------         ------                                     CBC W/ DIFFERENTIAL[[id number]]                              Final result                                                 Please view results for these tests on the individual orders.   CBC W/ DIFFERENTIAL         My EKG Interpretation: EKG    Rate, intervals and axes as noted on EKG Report.  Rate: 128  Rhythm: Sinus Rhythm  Reading: abnormal for rate, normal for rhythm, no acute ST changes           As reviewed and Interpreted by me      Imaging Results Available and Reviewed while in ED:   CT ABDOMEN+PELVIS(CONTRAST ONLY)(CPT=74177)    Result Date: 5/6/2024  CONCLUSION:  1. Acute epiploic appendagitis of the sigmoid colon.  2. Mild pancolonic diverticulosis without CT evidence of acute primary complication.   3. Possible hepatic steatosis.  4. Lesser incidental findings as above.    A preliminary report was issued by the [facility] teleradiology service. There are no major discrepancies.   Dictated by (CST): David Og MD on 5/06/2024 at 11:11 AM     Finalized by (CST): David Og MD on 5/06/2024 at 11:15 AM         My review and independent interpretation of  CT images: no free fluid. Radiology report corroborates this in addition to other details as reported by them.      Decision rules/scores evaluated: none      Diagnostic labs/tests considered but not ordered: none    ED Medications Administered:   Medications   ketorolac (Toradol) 30 MG/ML injection 30 mg (30 mg Intravenous Given 5/5/24 2220)   sodium chloride 0.9 % IV bolus 1,000 mL (0 mL Intravenous Stopped 5/5/24 2322)   iopamidol 76% (ISOVUE-370) injection for power injector (80 mL Intravenous Given 5/5/24 2304)                MDM       Medical Decision Making      Differential Diagnosis: After obtaining the patient's history, performing the physical exam and reviewing the diagnostics, multiple initial diagnoses were considered based on the presenting problem including diverticulitis, perforated intestine, SBO, appendicitis    External document review: I personally reviewed available external medical records for any recent pertinent discharge summaries, testing, and procedures - the findings are as follows: 4/9/24 visit with Dr. Del Real for  weight management    Complicating Factors: The patient already  has a past medical history of Esophageal reflux, Obesity (BMI 30-39.9), Sleep apnea, and Visual impairment. to contribute to the complexity of this ED evaluation.    Procedures performed: none    Discussed management with physician/appropriate source: none    Considered admission/deescalation of care for: none    Social determinants of health affecting patient care: none    Prescription medications considered: discussed continuing current medication regimen    The patient requires continuous monitoring for: abdominal pain    Shared decision making: discussed possible admission        Disposition and Plan     Clinical Impression:  1. Abdominal pain, left lower quadrant    2. Epiploic appendagitis        Disposition:  Discharge    Follow-up:  Juan Rodarte MD  [address] [place] [address]  [id number]  [phone]    Follow up        Medications Prescribed:  Discharge Medication List as of 5/6/2024 12:22 AM

## 2024-05-06 NOTE — ED PROVIDER NOTES
Pt signed out to me by Dr Du to f/u on CT Abd/Pel and disposition the patient. CT as below with epiploic appendagitis. D/w pt including supportive care, reasons to return.       CT ABDOMEN AND PELVIS WITH IV CONTRAST    IMPRESSION    Epiploic appendagitis adjacent to the sigmoid colon.  Inflammation is centered around a circumscribed region of fat.  In the absence of symptoms of infection, this is a nonsurgical entity treated primarily with pain management. Please correlate clinically.    Diverticulosis without evidence of diverticulitis.  Normal appendix  Remainder of the abdomen and pelvis are unremarkable.     Case faxed/finalized at 12:40 AM Eastern time, clinician with a patient .  If there are any questions please contact me at [phone].

## 2024-05-06 NOTE — ED INITIAL ASSESSMENT (HPI)
Patient c/o lower left abdominal pain that started on Thursday and has gotten worse. Denies N/V/D. Denies fevers or urinary concerns. States the pain is sharp and dull in nature denies radiation. Last BM 5/4/24.

## 2024-05-13 ENCOUNTER — TELEPHONE (OUTPATIENT)
Dept: INTERNAL MEDICINE CLINIC | Facility: CLINIC | Age: 39
End: 2024-05-13

## 2024-05-13 NOTE — TELEPHONE ENCOUNTER
Please call patient and let him know that I was forwarded information about his emergency room visit May 5.    Reason for the phone call is I do not think I and his primary physician listed.  It looks like Dr. Juan Rodarte is his primary care physician.    I just wanted to make sure he gets proper follow-up.  Please ask him to follow-up with his primary care physician.  Please document who his primary care physician is.  This way his primary care physician can review the emergency room results.

## 2024-05-13 NOTE — TELEPHONE ENCOUNTER
Called and spoke with pt who stated that Dr Rodarte is his PCP and he called Dr Rodarte's office this morning and left a voicemail asking for a for a call back .

## 2024-09-19 ENCOUNTER — OFFICE VISIT (OUTPATIENT)
Dept: SURGERY | Facility: CLINIC | Age: 39
End: 2024-09-19
Payer: COMMERCIAL

## 2024-09-19 VITALS
BODY MASS INDEX: 30.71 KG/M2 | HEART RATE: 106 BPM | WEIGHT: 239.31 LBS | DIASTOLIC BLOOD PRESSURE: 60 MMHG | SYSTOLIC BLOOD PRESSURE: 112 MMHG | OXYGEN SATURATION: 96 % | HEIGHT: 74 IN

## 2024-09-19 DIAGNOSIS — E66.9 OBESITY (BMI 30-39.9): ICD-10-CM

## 2024-09-19 DIAGNOSIS — K21.9 GASTRIC REFLUX: ICD-10-CM

## 2024-09-19 DIAGNOSIS — G47.33 OBSTRUCTIVE SLEEP APNEA (ADULT) (PEDIATRIC): ICD-10-CM

## 2024-09-19 DIAGNOSIS — E78.5 DYSLIPIDEMIA: Primary | ICD-10-CM

## 2024-09-19 PROBLEM — Z51.81 ENCOUNTER FOR THERAPEUTIC DRUG MONITORING: Status: ACTIVE | Noted: 2024-09-19

## 2024-09-19 PROCEDURE — 99214 OFFICE O/P EST MOD 30 MIN: CPT | Performed by: INTERNAL MEDICINE

## 2024-09-19 RX ORDER — DEXTROAMPHETAMINE SACCHARATE, AMPHETAMINE ASPARTATE, DEXTROAMPHETAMINE SULFATE AND AMPHETAMINE SULFATE 2.5; 2.5; 2.5; 2.5 MG/1; MG/1; MG/1; MG/1
10 TABLET ORAL DAILY
COMMUNITY
Start: 2024-08-26

## 2024-09-19 RX ORDER — MELOXICAM 15 MG/1
TABLET ORAL
COMMUNITY
Start: 2024-09-17

## 2024-09-19 RX ORDER — METHYLPREDNISOLONE 4 MG
TABLET, DOSE PACK ORAL
COMMUNITY
Start: 2024-08-07 | End: 2024-09-19 | Stop reason: ALTCHOICE

## 2024-09-19 NOTE — PROGRESS NOTES
[facility], [place]  [address]  [place] [address]  Dept: [phone]       Patient:  Chas Lennon  :      1985  MRN:      [id number]    Chief Complaint:    Chief Complaint   Patient presents with    Follow - Up    Weight Management       SUBJECTIVE     History of Present Illness:  Chas is being seen today for a follow-up for non surgical weight loss    Past Medical History:   Past Medical History:    Esophageal reflux    Obesity (BMI 30-39.9)    Sleep apnea    Visual impairment        Comorbidities:  Back pain-Improvement?  yes, Joint pain-Improvement?  yes, REENA-Improvement?  yes, and Snoring-Improvement?  yes    OBJECTIVE     Vitals: /60   Pulse 106   Ht 6' 2\" (1.88 m)   Wt 239 lb 4.8 oz (108.5 kg)   SpO2 96%   BMI 30.72 kg/m²     Initial weight loss: -11   Total weight loss: -11    Start weight: 250    Wt Readings from Last 3 Encounters:   24 239 lb 4.8 oz (108.5 kg)   24 240 lb (108.9 kg)   24 250 lb (113.4 kg)       Patient Medications:    Current Outpatient Medications   Medication Sig Dispense Refill    amphetamine-dextroamphetamine 10 MG Oral Tab Take 1 tablet (10 mg total) by mouth daily.      Meloxicam 15 MG Oral Tab       Omeprazole 40 MG Oral Capsule Delayed Release Take 1 capsule (40 mg total) by mouth daily. Take 1 capsule by mouth daily before breakfast. 90 capsule 1    nortriptyline 25 MG Oral Cap TAKE 1 CAPSULE BY MOUTH AT BEDTIME. TAKE IN COMBINATION WITH 10MG FOR A NIGHTLY TOTAL OF 35MG.      cetirizine 10 MG Oral Tab Take 1 tablet (10 mg total) by mouth.      Azelastine HCl 0.1 % Nasal Solution 2 sprays.      famotidine 20 MG Oral Tab Take 1 tablet (20 mg total) by mouth daily. (Patient not taking: Reported on 2024)       Allergies:  Amoxicillin-pot clavulanate and Dust mite extract     Social History:    Social History     Socioeconomic History    Marital status: Single      Spouse name: Not on file    Number of children: Not on file    Years of education: Not on file    Highest education level: Not on file   Occupational History    Not on file   Tobacco Use    Smoking status: Never    Smokeless tobacco: Never   Vaping Use    Vaping status: Never Used   Substance and Sexual Activity    Alcohol use: Yes     Comment: rarely    Drug use: Never    Sexual activity: Not on file   Other Topics Concern    Not on file   Social History Narrative    Not on file     Social Determinants of Health     Financial Resource Strain: Low Risk  (8/19/2024)    Received from [facility]    Overall Financial Resource Strain (CARDIA)     Difficulty of Paying Living Expenses: Not very hard   Food Insecurity: No Food Insecurity (8/19/2024)    Received from [facility]    Hunger Vital Sign     Worried About Running Out of Food in the Last Year: Never true     Ran Out of Food in the Last Year: Never true   Transportation Needs: No Transportation Needs (8/19/2024)    Received from [facility]    PRAPARE - Transportation     Lack of Transportation (Medical): No     Lack of Transportation (Non-Medical): No   Physical Activity: Not on file   Stress: Not on file   Social Connections: Not on file   Housing Stability: Low Risk  (8/19/2024)    Received from [facility]    Housing Stability Vital Sign     Unable to Pay for Housing in the Last Year: No     Number of Places Lived in the Last Year: 1     Unstable Housing in the Last Year: No     Surgical History:    Past Surgical History:   Procedure Laterality Date    Colonoscopy  11/2015    @ [place]     Family History:    Family History   Problem Relation Age of Onset    Cancer Mother 28        cervical cancer and melonoma    Hypertension Maternal Grandfather     Lipids Maternal Grandfather     Cancer Paternal Grandfather 63        colon cancer       Food Journal  Reviewed and Discussed:        Patient has a Food Journal?: yes   Patient is reading nutrition labels?  yes  Average Caloric Intake:     Average CHO Intake: 130  Is patient exercising? yes  Type of exercise? Walking  swimming    Eating Habits  Patient states the following:  Eats 3 meal(s) per day  Length of time it takes to consume a meal:  20  # of snacks per day: 1 Type of snacks:  fruit  Amount of soda consumption per day:  regular  Amount of water (in ounces) per day:  adeq  Drinking between meals only:  yes  Toughest challenge:      Nutritional Goals  Limit carbohydrates to 100 gms per day, Eat 100-200 calories within 1 hour of waking , and Eat 3-4 cups of fresh fruits or vegetables daily    Behavior Modifications Reviewed and Discussed  Eat breakfast, Eat 3 meals per day, Plan meals in advance, Read nutrition labels, Drink 64 oz of water per day, Maintain a daily food journal, No drinking 30 minutes before or after meals, Utlize portion control strategies to reduce calorie intake, Identify triggers for eating and manage cues, and Eat slowly and take 20 to 30 minutes to complete each meal    Exercise Goals Reviewed and Discussed    Increase as tolerated    ROS:    Constitutional: negative  Respiratory: negative  Cardiovascular: negative  Gastrointestinal: negative  Musculoskeletal:negative  Neurological: negative  Behavioral/Psych: negative  Endocrine: negative  All other systems were reviewed and are negative    Physical Exam:   General appearance: alert, appears stated age, cooperative, and mildly obese  Head: Normocephalic, without obvious abnormality, atraumatic  Back: symmetric, no curvature. ROM normal. No CVA tenderness.  Lungs: clear to auscultation bilaterally  Heart: S1, S2 normal, no murmur, click, rub or gallop, regular rate and rhythm  Abdomen: soft, non-tender; bowel sounds normal; no masses,  no organomegaly  Extremities: extremities normal, atraumatic, no cyanosis or edema  Pulses: 2+ and symmetric  Skin: Skin color,  texture, turgor normal. No rashes or lesions  Neurologic: Grossly normal    ASSESSMENT     HYPERCHOLESTEROLEMIA:  The patient states that his cholesterol has been well controlled on his current medication.    No results found for: \"CHOLEST\", \"LDL\", \"HDL\", \"TRIG\", \"VLDL\", \"TCHDLRATIO\"    GERD:  The patient states his acid reflux has been well controlled with his current medication.  No symptoms of heartburn or indigestion.    OBSTRUCTIVE SLEEP APNEA: The patient states his sleep apnea has been stable since the last clinic visit. There has not been any increase in hyper-somnolence.     Encounter Diagnosis(ses):   Encounter Diagnoses   Name Primary?    Dyslipidemia Yes    Obstructive sleep apnea (adult) (pediatric)     Gastric reflux     Encounter for therapeutic drug monitoring     Obesity (BMI 30-39.9)        PLAN     Patient is not interested in bariatric surgery. Patient desires to pursue traditional weight loss at this time.      DYSLIPIDEMIA: Stable on the above prescribed meal plan and medication. Liver function stable.    No results found for: \"CHOLEST\", \"LDL\", \"HDL\", \"TRIG\", \"VLDL\", \"TCHDLRATIO\"    GERD: The patient believes his reflux is somewhat better of at least no worse on current medication. He was encouraged to avoid caffeine products, elevated head of bed and not eat for 1 hour before bedtime. No interval changes were made in his anti-reflux medications.       Patient was instructed to continue wearing their CPaP as recommended.       Goals for next month:  1. Keep a food log.  2. Drink 48-64 ounces of non-caloric beverages per day. No fruit juices or regular soda.  3. Increase activity-upper body exercises, walk 10 minutes per day.  4. Increase fruit and vegetable servings to 5-6 per day.      No meds at this time    Feels better    Happy with results         Diagnoses and all orders for this visit:    Dyslipidemia    Obstructive sleep apnea (adult) (pediatric)    Gastric reflux    Encounter for  therapeutic drug monitoring    Obesity (BMI 30-39.9)          Nash Del Real MD

## 2024-09-23 RX ORDER — OMEPRAZOLE 40 MG/1
40 CAPSULE, DELAYED RELEASE ORAL DAILY
Qty: 90 CAPSULE | Refills: 1 | Status: SHIPPED | OUTPATIENT
Start: 2024-09-23

## 2024-09-23 NOTE — TELEPHONE ENCOUNTER
Requested Prescriptions     Pending Prescriptions Disp Refills    OMEPRAZOLE 40 MG Oral Capsule Delayed Release [Pharmacy Med Name: OMEPRAZOLE DR 40 MG CAPSULE] 90 capsule 1     Sig: TAKE 1 CAPSULE (40 MG TOTAL) BY MOUTH DAILY. TAKE 1 CAPSULE BY MOUTH DAILY BEFORE BREAKFAST.       LOV  12/13/2023  LR  3/18/2024    em

## 2024-09-26 ENCOUNTER — TELEPHONE (OUTPATIENT)
Dept: SURGERY | Facility: CLINIC | Age: 39
End: 2024-09-26

## 2025-04-09 ENCOUNTER — OFFICE VISIT (OUTPATIENT)
Dept: SURGERY | Facility: CLINIC | Age: 40
End: 2025-04-09
Payer: COMMERCIAL

## 2025-04-09 VITALS
SYSTOLIC BLOOD PRESSURE: 124 MMHG | RESPIRATION RATE: 16 BRPM | HEIGHT: 74 IN | HEART RATE: 96 BPM | BODY MASS INDEX: 31.44 KG/M2 | OXYGEN SATURATION: 100 % | DIASTOLIC BLOOD PRESSURE: 82 MMHG | WEIGHT: 245 LBS

## 2025-04-09 DIAGNOSIS — K21.9 GASTRIC REFLUX: ICD-10-CM

## 2025-04-09 DIAGNOSIS — E78.5 DYSLIPIDEMIA: ICD-10-CM

## 2025-04-09 DIAGNOSIS — E66.9 OBESITY (BMI 30-39.9): ICD-10-CM

## 2025-04-09 DIAGNOSIS — G47.33 OBSTRUCTIVE SLEEP APNEA (ADULT) (PEDIATRIC): Primary | ICD-10-CM

## 2025-04-09 PROCEDURE — 99214 OFFICE O/P EST MOD 30 MIN: CPT | Performed by: INTERNAL MEDICINE

## 2025-04-09 RX ORDER — NORTRIPTYLINE HYDROCHLORIDE 10 MG/1
10 CAPSULE ORAL NIGHTLY
COMMUNITY
Start: 2025-04-04

## 2025-04-09 NOTE — PROGRESS NOTES
[facility], [facility], [place]  [address]  [place] [address]  Dept: [phone]       Patient:  Chas Lennon  :      1985  MRN:      [id number]    Chief Complaint:    Chief Complaint   Patient presents with    Follow - Up    Weight Management       SUBJECTIVE     History of Present Illness:  Chas is being seen today for a follow-up for non surgical weight loss    Past Medical History:   Past Medical History:    Esophageal reflux    Obesity (BMI 30-39.9)    Sleep apnea    Visual impairment        Comorbidities:  Back pain-Improvement?  yes, Joint pain-Improvement?  yes, REENA-Improvement?  yes, and Snoring-Improvement?  yes    OBJECTIVE     Vitals: /82   Pulse 96   Resp 16   Ht 6' 2\" (1.88 m)   Wt 245 lb (111.1 kg)   SpO2 100%   BMI 31.46 kg/m²     Initial weight loss: +06   Total weight loss: -05   Start weight: 250    Wt Readings from Last 3 Encounters:   25 245 lb (111.1 kg)   24 239 lb 4.8 oz (108.5 kg)   24 240 lb (108.9 kg)       Patient Medications:    Current Outpatient Medications   Medication Sig Dispense Refill    OMEPRAZOLE 40 MG Oral Capsule Delayed Release TAKE 1 CAPSULE (40 MG TOTAL) BY MOUTH DAILY. TAKE 1 CAPSULE BY MOUTH DAILY BEFORE BREAKFAST. 90 capsule 1    amphetamine-dextroamphetamine 10 MG Oral Tab Take 1 tablet (10 mg total) by mouth daily.      Meloxicam 15 MG Oral Tab       famotidine 20 MG Oral Tab Take 1 tablet (20 mg total) by mouth daily. (Patient not taking: Reported on 2024)      nortriptyline 25 MG Oral Cap TAKE 1 CAPSULE BY MOUTH AT BEDTIME. TAKE IN COMBINATION WITH 10MG FOR A NIGHTLY TOTAL OF 35MG.      cetirizine 10 MG Oral Tab Take 1 tablet (10 mg total) by mouth.      Azelastine HCl 0.1 % Nasal Solution 2 sprays.       Allergies:  Amoxicillin-pot clavulanate and Dust mite extract     Social History:    Social History     Socioeconomic History    Marital status: Single      Spouse name: Not on file    Number of children: Not on file    Years of education: Not on file    Highest education level: Not on file   Occupational History    Not on file   Tobacco Use    Smoking status: Never    Smokeless tobacco: Never   Vaping Use    Vaping status: Never Used   Substance and Sexual Activity    Alcohol use: Yes     Comment: rarely    Drug use: Never    Sexual activity: Not on file   Other Topics Concern    Not on file   Social History Narrative    Not on file     Social Drivers of Health     Food Insecurity: No Food Insecurity (2/21/2025)    Received from [facility]    Hunger Vital Sign     Worried About Running Out of Food in the Last Year: Never true     Ran Out of Food in the Last Year: Never true   Transportation Needs: No Transportation Needs (2/21/2025)    Received from [facility]    PRAPARE - Transportation     Lack of Transportation (Medical): No     Lack of Transportation (Non-Medical): No   Stress: Not on file   Housing Stability: Low Risk  (8/19/2024)    Received from [facility]    Housing Stability Vital Sign     Unable to Pay for Housing in the Last Year: No     Number of Places Lived in the Last Year: 1     Unstable Housing in the Last Year: No     Surgical History:    Past Surgical History:   Procedure Laterality Date    Colonoscopy  11/2015    @ [employer]     Family History:    Family History   Problem Relation Age of Onset    Cancer Mother 28        cervical cancer and melonoma    Hypertension Maternal Grandfather     Lipids Maternal Grandfather     Cancer Paternal Grandfather 63        colon cancer       Food Journal  Reviewed and Discussed:       Patient has a Food Journal?: yes   Patient is reading nutrition labels?  yes  Average Caloric Intake:     Average CHO Intake: 130  Is patient exercising? yes  Type of exercise? Walking  swimming    Eating Habits  Patient states the following:  Eats 3 meal(s) per  day  Length of time it takes to consume a meal:  20  # of snacks per day: 1 Type of snacks:  fruit  Amount of soda consumption per day:  regular  Amount of water (in ounces) per day:  adeq  Drinking between meals only:  yes  Toughest challenge:      Nutritional Goals  Limit carbohydrates to 100 gms per day, Eat 100-200 calories within 1 hour of waking , and Eat 3-4 cups of fresh fruits or vegetables daily    Behavior Modifications Reviewed and Discussed  Eat breakfast, Eat 3 meals per day, Plan meals in advance, Read nutrition labels, Drink 64 oz of water per day, Maintain a daily food journal, No drinking 30 minutes before or after meals, Utlize portion control strategies to reduce calorie intake, Identify triggers for eating and manage cues, and Eat slowly and take 20 to 30 minutes to complete each meal    Exercise Goals Reviewed and Discussed    Increase as tolerated    ROS:    Constitutional: negative  Respiratory: negative  Cardiovascular: negative  Gastrointestinal: negative  Musculoskeletal:negative  Neurological: negative  Behavioral/Psych: negative  Endocrine: negative  All other systems were reviewed and are negative    Physical Exam:   General appearance: alert, appears stated age, cooperative, and mildly obese  Head: Normocephalic, without obvious abnormality, atraumatic  Back: symmetric, no curvature. ROM normal. No CVA tenderness.  Lungs: clear to auscultation bilaterally  Heart: S1, S2 normal, no murmur, click, rub or gallop, regular rate and rhythm  Abdomen: soft, non-tender; bowel sounds normal; no masses,  no organomegaly  Extremities: extremities normal, atraumatic, no cyanosis or edema  Pulses: 2+ and symmetric  Skin: Skin color, texture, turgor normal. No rashes or lesions  Neurologic: Grossly normal    ASSESSMENT     HYPERCHOLESTEROLEMIA:  The patient states that his cholesterol has been well controlled on his current medication.    No results found for: \"CHOLEST\", \"LDL\", \"HDL\", \"TRIG\", \"VLDL\",  \"TCHDLRATIO\"    GERD:  The patient states his acid reflux has been well controlled with his current medication.  No symptoms of heartburn or indigestion.    OBSTRUCTIVE SLEEP APNEA: The patient states his sleep apnea has been stable since the last clinic visit. There has not been any increase in hyper-somnolence.     Encounter Diagnosis(ses):   Encounter Diagnoses   Name Primary?    Obstructive sleep apnea (adult) (pediatric) Yes    Gastric reflux     Dyslipidemia     Obesity (BMI 30-39.9)        PLAN     Patient is not interested in bariatric surgery. Patient desires to pursue traditional weight loss at this time.      DYSLIPIDEMIA: Stable on the above prescribed meal plan and medication. Liver function stable.    No results found for: \"CHOLEST\", \"LDL\", \"HDL\", \"TRIG\", \"VLDL\", \"TCHDLRATIO\"    GERD: The patient believes his reflux is somewhat better of at least no worse on current medication. He was encouraged to avoid caffeine products, elevated head of bed and not eat for 1 hour before bedtime. No interval changes were made in his anti-reflux medications.       Patient was instructed to continue wearing their CPaP as recommended.       Goals for next month:  1. Keep a food log.  2. Drink 48-64 ounces of non-caloric beverages per day. No fruit juices or regular soda.  3. Increase activity-upper body exercises, walk 10 minutes per day.  4. Increase fruit and vegetable servings to 5-6 per day.      No meds at this time    Feels better    Happy with results     Should continue adderall per neuro team    Diagnoses and all orders for this visit:    Obstructive sleep apnea (adult) (pediatric)    Gastric reflux    Dyslipidemia    Obesity (BMI 30-39.9)          Nash Del Real MD

## 2025-05-08 ENCOUNTER — OFFICE VISIT (OUTPATIENT)
Dept: SURGERY | Facility: CLINIC | Age: 40
End: 2025-05-08
Payer: COMMERCIAL

## 2025-05-08 VITALS — HEIGHT: 72 IN | WEIGHT: 240 LBS | BODY MASS INDEX: 32.51 KG/M2

## 2025-05-08 DIAGNOSIS — N52.9 ERECTILE DYSFUNCTION, UNSPECIFIED ERECTILE DYSFUNCTION TYPE: Primary | ICD-10-CM

## 2025-05-08 PROCEDURE — 99204 OFFICE O/P NEW MOD 45 MIN: CPT | Performed by: UROLOGY

## 2025-05-08 RX ORDER — SILDENAFIL 100 MG/1
100 TABLET, FILM COATED ORAL
Qty: 10 TABLET | Refills: 11 | Status: SHIPPED | OUTPATIENT
Start: 2025-05-08

## 2025-05-08 NOTE — PROGRESS NOTES
Lena Fisher MD  Department of Urology  [address]  [place] [address]    T: [phone]  F: [phone]    To: Juan Rodarte MD   [address] [address]    Re: Chas Lennon   MRN: [id number]  : 1985    Dear Juan Rodarte MD,    Today I had the pleasure of seeing Chas Lennon in my clinic. As you know, Mr. Lennon is a pleasant 39 year old year old male who I am seeing for ED. Patient was last seen in this department on Visit date not found.    Briefly, patient states that he has erectile dysfunction.  He does not have chest pain and is not on nitrates       PAST MEDICAL HISTORY:  Past Medical History[1]     PAST SURGICAL HISTORY:  Past Surgical History[2]      ALLERGIES:  Allergies[3]      MEDICATIONS:  Current Outpatient Medications   Medication Instructions    amphetamine-dextroamphetamine 10 MG Oral Tab 10 mg, Oral, Daily    Azelastine HCl 0.1 % Nasal Solution 2 sprays    cetirizine (ZYRTEC) 10 mg, Oral    famotidine (PEPCID) 20 mg, Daily    nortriptyline (PAMELOR) 10 mg, Nightly    nortriptyline 25 MG Oral Cap TAKE 1 CAPSULE BY MOUTH AT BEDTIME. TAKE IN COMBINATION WITH 10MG FOR A NIGHTLY TOTAL OF 35MG.    Omeprazole 40 mg, Oral, Daily, Take 1 capsule by mouth daily before breakfast.        FAMILY HISTORY:  Family History[4]     SOCIAL HISTORY:  Short Social Hx on File[5]       PHYSICAL EXAMINATION:  There were no vitals filed for this visit.  CONSTITUTIONAL: No apparent distress, cooperative and communicative  NEUROLOGIC: Alert and oriented   HEAD: Normocephalic, atraumatic   EYES: Sclera non-icteric   ENT: Hearing intact, moist mucous membranes   NECK: No obvious goiter or masses   RESPIRATORY: Normal respiratory effort, Nonlabored breathing on room air  SKIN: No evident rashes   ABDOMEN: Soft, nontender, nondistended, no rebound tenderness, no guarding, no masses      REVIEW OF SYSTEMS:    A comprehensive 10-point review of  systems was completed.  Pertinent positives and negatives are noted in the the HPI.       LABORATORY DATA:  ef Range & Units (hover) 5/5/24 10:04 PM   Glucose 102 High    Sodium 140   Potassium 3.8   Chloride 107   CO2 27.0   Anion Gap 6   BUN 12   Creatinine 1.07   BUN/CREA Ratio 11.2   Calcium, Total 9.5   Calculated Osmolality 290   eGFR-Cr 92     Ref Range & Units (hover) 5/5/24 10:04 PM   WBC 10.6   RBC 5.53   HGB 15.7   HCT 47.7   MCV 86.3   MCH 28.4   MCHC 32.9   RDW-SD 39.0   RDW 12.4   .0   Neutrophil Absolute Prelim 6.82   Neutrophil Absolute 6.82   Lymphocyte Absolute 2.79   Monocyte Absolute 0.79   Eosinophil Absolute 0.13   Basophil Absolute 0.07   Immature Granulocyte Absolute 0.02   Neutrophil % 64.2   Lymphocyte % 26.3   Monocyte % 7.4     COLOR  YELLOW YELLOW   CLARITY  CLEAR CLEAR   GLUCOSE  NEG MG/DL NEG   BILIRUBIN  NEG NEG   KETONES  NEG MG/DL TRACE Abnormal    SPEC GRAVITY  1.003 - 1.035 1.025   BLOOD  NEG TRACE-INTACT Abnormal    PH  4.6 - 8.0 6.0   PROTEIN  NEG MG/DL NEG   UROBILINOGEN  0.2 - 1.0 MG/DL 0.2   NITRITE  NEG NEG   LEUKOCYTES  NEG NEG           IMAGING REVIEW:  Narrative   PROCEDURE: CT ABDOMEN + PELVIS (CONTRAST ONLY) (CPT=74177)     COMPARISON: [facility], CT ABD/PELV WO CON FOR APPY, 9/24/2015, 11:12 PM.     INDICATIONS: Left flank pain.     TECHNIQUE: Multidetector CT images of the abdomen and pelvis were obtained with non-ionic intravenous contrast material. Automated exposure control for dose reduction was used. Adjustment of the mA and/or kV was done based on the patient's size.  Iterative reconstruction technique for dose reduction was employed. Dose information was transmitted to the ACR (American College of Radiology) NRDR (National Radiology Data Registry), which includes the Dose Index Registry. Oral contrast was not  ingested.     FINDINGS:  LUNG BASES: The heart is normal in size. There is dependent subsegmental atelectasis bilaterally.  Additional scattered ground-glass and reticular opacities are present and may be atelectatic in origin.  LIVER: Nonspecific low density of the hepatic parenchyma may represent underlying hepatic steatosis.    BILIARY: The gallbladder is present.  PANCREAS: There is mild diffuse fatty replacement without discernible lesion, fluid collection, or ductal dilatation.  SPLEEN: Borderline in size.  ADRENALS:   No defined mass or abnormal enlargement.    KIDNEYS:   Symmetric enhancement is seen without evidence of hydronephrosis or underlying solid masses.  GI/MESENTERY: A small hiatal hernia is evident. There is no evidence of bowel obstruction. A normal caliber appendix is seen without inflammatory manifestations. Scattered colonic diverticula are present throughout the length of the colon. There is no  clear evidence of primary impacted diverticulum. There is no significant primary colonic wall thickening. Along the sigmoid colon, there is ovoid central fat attenuation with minimal focal pericolonic fat stranding.    URINARY BLADDER: No visible calculus or focal wall thickening. A fibrous urachal remnant/median umbilical ligament extends from the bladder dome to the umbilicus.    PELVIC NODES: No lymphadenopathy.    PELVIC ORGANS: No visible mass. Pelvic organs appropriate for patient age. Deep pelvic calcifications likely represent phleboliths.    VASCULATURE:   No aneurysm is detected.  RETROPERITONEUM: No mass or lymphadenopathy is apparent.    BONES:   Minimal multilevel degenerative changes of the spine are appreciated.  ABDOMINAL WALL: There is a minute fat-containing umbilical hernia.  OTHER: No free air or fluid is seen in the abdomen or pelvis.                    Impression   CONCLUSION:  1. Acute epiploic appendagitis of the sigmoid colon.     2. Mild pancolonic diverticulosis without CT evidence of acute primary complication.       3. Possible hepatic steatosis.     4. Lesser incidental findings as above.            A preliminary report was issued by the [facility] Radiology teleradiology service. There are no major discrepancies.        OTHER RELEVANT DATA:   none     IMPRESSION: Erectile dysfunction-recommended behavioral management and offered Viagra    I discussed the variety of different treatment options for erectile dysfunction including behavioral management with weight loss, diet, exercise, meditation, open lines of communication with his partner.  We discussed phosphodiesterase inhibitors including Viagra and Cialis.  I mentioned that these medication should be taken 1 to 2 hours prior to sexual activity.  Viagra should be taken in the absence of fatty foods.  The side effects of these medications are rhinorrhea, facial flushing, GI upset, with Cialis muscle fatigues/aches, and very rarely priapism.  I also discussed Muse therapy (intraurethral alprostadil) which has the side effect of being inconsistent and having urethral discomfort.  Intracavernosal injection was described as an option for erectile dysfunction that is not responsive to medications.  I mention that he would require teaching and dose adjustment.  The biggest risk associated with intracavernosal injection is priapism.  I also discussed vacuum erection device and very briefly as a last resort penile prosthesis either a malleable or an inflatable device. I mentioned that penile prostheses is a last resort surgical treatment and should only be pursued when all other options have been exhausted.       PLAN:  Viagra 25-100mg prn  Rtc prn    Thank you for referring this very pleasant patient to my clinic. If you have any questions or concerns, please do not hesitate to contact me.    Sincerely,  Lena Fisher MD    30 minutes were spent on this patient at this visit obtaining a history, reviewing medical records, developing a treatment plan, counseling and discussing treatment strategy with patient, coordination of care and documentation.     The 21st  Century Cures Act makes medical notes available to patients in the interest of transparency.  However, please be advised that this is a medical document.  It is intended as a peer to peer communication.  It is written in medical language and may contain abbreviations or verbiage that are technical and unfamiliar.  It may appear blunt or direct.  Medical documents are intended to carry relevant information, facts as evident, and the clinical opinion of the practitioner.         [1]   Past Medical History:   Esophageal reflux    Obesity (BMI 30-39.9)    Sleep apnea    Visual impairment   [2]   Past Surgical History:  Procedure Laterality Date    Colonoscopy  11/2015    @ [facility]   [3]   Allergies  Allergen Reactions    Amoxicillin-Pot Clavulanate DIARRHEA and NAUSEA ONLY    Dust Mite Extract Runny nose   [4]   Family History  Problem Relation Age of Onset    Cancer Mother 28        cervical cancer and melonoma    Hypertension Maternal Grandfather     Lipids Maternal Grandfather     Cancer Paternal Grandfather 63        colon cancer   [5]   Social History  Socioeconomic History    Marital status: Single   Tobacco Use    Smoking status: Never    Smokeless tobacco: Never   Vaping Use    Vaping status: Never Used   Substance and Sexual Activity    Alcohol use: Yes     Comment: rarely    Drug use: Never     Social Drivers of Health     Food Insecurity: No Food Insecurity (2/21/2025)    Received from [facility]    Hunger Vital Sign     Worried About Running Out of Food in the Last Year: Never true     Ran Out of Food in the Last Year: Never true   Transportation Needs: No Transportation Needs (2/21/2025)    Received from [facility]    PRAPARE - Transportation     Lack of Transportation (Medical): No     Lack of Transportation (Non-Medical): No   Housing Stability: Low Risk  (8/19/2024)    Received from [facility]    Housing Stability Vital Sign     Unable to  Pay for Housing in the Last Year: No     Number of Places Lived in the Last Year: 1     Unstable Housing in the Last Year: No